# Patient Record
Sex: MALE | Race: WHITE | NOT HISPANIC OR LATINO
[De-identification: names, ages, dates, MRNs, and addresses within clinical notes are randomized per-mention and may not be internally consistent; named-entity substitution may affect disease eponyms.]

---

## 2022-07-12 ENCOUNTER — APPOINTMENT (OUTPATIENT)
Dept: SURGERY | Facility: CLINIC | Age: 62
End: 2022-07-12

## 2022-07-12 VITALS
RESPIRATION RATE: 18 BRPM | SYSTOLIC BLOOD PRESSURE: 131 MMHG | BODY MASS INDEX: 29.55 KG/M2 | DIASTOLIC BLOOD PRESSURE: 80 MMHG | OXYGEN SATURATION: 98 % | HEIGHT: 68 IN | TEMPERATURE: 98 F | HEART RATE: 63 BPM | WEIGHT: 195 LBS

## 2022-07-12 DIAGNOSIS — K62.5 HEMORRHAGE OF ANUS AND RECTUM: ICD-10-CM

## 2022-07-12 DIAGNOSIS — K64.8 OTHER HEMORRHOIDS: ICD-10-CM

## 2022-07-12 DIAGNOSIS — Z82.49 FAMILY HISTORY OF ISCHEMIC HEART DISEASE AND OTHER DISEASES OF THE CIRCULATORY SYSTEM: ICD-10-CM

## 2022-07-12 DIAGNOSIS — Z78.9 OTHER SPECIFIED HEALTH STATUS: ICD-10-CM

## 2022-07-12 PROCEDURE — 99203 OFFICE O/P NEW LOW 30 MIN: CPT | Mod: 25

## 2022-07-12 PROCEDURE — 46600 DIAGNOSTIC ANOSCOPY SPX: CPT

## 2022-07-12 RX ORDER — EZETIMIBE 10 MG/1
10 TABLET ORAL
Qty: 30 | Refills: 0 | Status: ACTIVE | COMMUNITY
Start: 2022-03-29

## 2022-07-12 RX ORDER — NYSTATIN AND TRIAMCINOLONE ACETONIDE 100000; 1 MG/G; MG/G
100000-0.1 CREAM TOPICAL 3 TIMES DAILY
Qty: 30 | Refills: 3 | Status: ACTIVE | COMMUNITY
Start: 2022-07-12 | End: 1900-01-01

## 2022-07-12 RX ORDER — SERTRALINE HYDROCHLORIDE 100 MG/1
100 TABLET, FILM COATED ORAL
Qty: 45 | Refills: 0 | Status: ACTIVE | COMMUNITY
Start: 2022-03-25

## 2022-07-12 NOTE — HISTORY OF PRESENT ILLNESS
[FreeTextEntry1] : Quinton is a 61 year old male here for consultation. \par \par Colonoscopy 7/7/21 by Dr. Ang- benign neoplasm of sigmoid colon, internal hemorrhoids. Pathology; sigmoid polyp biopsy- polypoid colonic mucosa demonstrating focal hyperplastic features. \par \par Today pt reports feeling well, no pain. 1-2 formed BMs daily, no pain, pt reports BRB dripping into toilet for 3-4 months, last episode of rectal bleeding 3 weeks ago. Denies prolapsing tissue. No episodes of incontinence. Good appetite, no nausea, no vomiting. Denies fever and chills. Pt on Aspiring 81 mg daily for 1 cardiac stent. \par

## 2022-07-12 NOTE — PHYSICAL EXAM
[Normal Breath Sounds] : Normal breath sounds [Normal Heart Sounds] : normal heart sounds [No Rash or Lesion] : No rash or lesion [Alert] : alert [Oriented to Person] : oriented to person [Oriented to Place] : oriented to place [Oriented to Time] : oriented to time [Calm] : calm [Abdomen Masses] : No abdominal masses [Abdomen Tenderness] : ~T No ~M abdominal tenderness [No HSM] : no hepatosplenomegaly [JVD] : no jugular venous distention  [de-identified] : Perianal rash consistent with fungal rash.  Digital rectal examination and perianal inspection revealed no evidence of fissure or fistula or abscess.  Anoscopy reveals enlarged nonbleeding internal hemorrhoids with otherwise normal distal rectal mucosa [de-identified] : WNL [de-identified] : WNL [de-identified] : ROM WNL

## 2022-07-12 NOTE — ASSESSMENT
[FreeTextEntry1] : In summary the patient had rectal bleeding several months ago.  His last episode was 3 to 4 weeks ago.  This is almost surely from his internal hemorrhoids.  I recommended fiber supplementation and rubber band ligations of his internal hemorrhoids if his bleeding recurs.  He should hold his baby aspirin for several days prior to the procedure if okay with his cardiologist.  I also prescribed topical nystatin cream for his perianal rash

## 2022-07-12 NOTE — CONSULT LETTER
[Dear  ___] : Dear  [unfilled], [Consult Letter:] : I had the pleasure of evaluating your patient, [unfilled]. [Please see my note below.] : Please see my note below. [Consult Closing:] : Thank you very much for allowing me to participate in the care of this patient.  If you have any questions, please do not hesitate to contact me. [Sincerely,] : Sincerely, [FreeTextEntry3] : Jaime Palma M.D., F.A.C.S, F.A.S.C.R.S [DrHawa  ___] : Dr. CAR [DrHawa ___] : Dr. CAR

## 2022-11-15 ENCOUNTER — APPOINTMENT (OUTPATIENT)
Dept: ORTHOPEDIC SURGERY | Facility: CLINIC | Age: 62
End: 2022-11-15

## 2022-11-15 VITALS — HEIGHT: 68 IN | BODY MASS INDEX: 29.55 KG/M2 | WEIGHT: 195 LBS

## 2022-11-15 PROCEDURE — 20550 NJX 1 TENDON SHEATH/LIGAMENT: CPT

## 2022-11-15 PROCEDURE — 99214 OFFICE O/P EST MOD 30 MIN: CPT | Mod: 25

## 2022-11-15 NOTE — IMAGING
[de-identified] : left middle finger TTP a1 pulley\par +triggering\par otherwise farom\par NVID\par

## 2022-11-15 NOTE — ASSESSMENT
[FreeTextEntry1] : We reviewed the anatomy of the flexor sheath and pathology of trigger fingers with the use of drawings and discussion.  We discussed the treatment options including splinting/nsaids, injection and surgery.  We discussed that too many injections may lead to weakening o the tendon/tendon rupture and the safety of two injections. After a discussion of the risks, benefits and alternatives along with the expectations, the patient was amenable to injection.  The indication for injection is pain and inflammation.  The skin overlying the tendon sheath/A1 pulley site was prepared with alcohol and ethyl chloride was sprayed topically.  Sterile technique was used. An injection of 1ml of lidocaine and 6mg of betamethasone was used.  The patient was instructed to call if redness, pain or fever occur.  They ay apply ice for 15 minutes eery hour for the next 12-24 hours as tolerated.  .  The patient understands that it may take 2-5 days to see a noticeable difference.  Sterile Band-Aid was applied.\par \par Pt was given 1st TF CSI in left middle finger today.

## 2022-11-15 NOTE — HISTORY OF PRESENT ILLNESS
[de-identified] : 11/15/22:  Pt has left middle finger triggering\par \par 3/31/22: Pt is still feeling soreness after left LF trigger finger percutaneous release. Left RF, MF,\par and right thumb are sore too\par \par 2/8/2022: Pt here s/p left pinky trigger finger percutaneous release. Pt has mild pain, however there is no locking noted.\par Pt also complains of recurrent right lateral elbow pain over past week. \par \par 1/31/2022: LHD male here for f/u of left pinky finger triggering. Pt has already received 3 previous CSIs. Pt states that\par his symptoms began again this past October. \par \par 8/31/2021: Pt is here for f/u of right thumb and left pinky trigger fingers. Both of these digits have been injected twice\par (12/10/20 and 5/3/21).\par \par 5/03/21: Pt here s/p right index finger percutaneous release. Pt still complains of recurrent right trigger finger s/p CSI x\par 2 (pt noted pain relief x 2 mos). \par Pt also complains of left pinky triggering today. \par \par 2/02/21: rt if tf better after perc release. now c/o rt thumb pain. \par \par 12/10/20: Rt if tf symptoms reoccurred. Rt elbow symptoms same. \par \par 8/10/2020: Pt states s/p CSI to the right lateral epicondylar region and formal PT with minimal relief of pain. Pt is here\par to discuss PRP injection. \par \par 6/11/20: improved both.\par \par 5/18/20: R IF TF and R latera elbow pain

## 2023-03-14 ENCOUNTER — APPOINTMENT (OUTPATIENT)
Dept: ORTHOPEDIC SURGERY | Facility: CLINIC | Age: 63
End: 2023-03-14
Payer: COMMERCIAL

## 2023-03-14 VITALS — BODY MASS INDEX: 29.55 KG/M2 | WEIGHT: 195 LBS | HEIGHT: 68 IN

## 2023-03-14 PROCEDURE — 20550 NJX 1 TENDON SHEATH/LIGAMENT: CPT | Mod: LT

## 2023-03-14 PROCEDURE — 99214 OFFICE O/P EST MOD 30 MIN: CPT | Mod: 25

## 2023-03-14 NOTE — ASSESSMENT
[FreeTextEntry1] : We reviewed the anatomy of the flexor sheath and pathology of trigger fingers with the use of drawings and discussion.  We discussed the treatment options including splinting/nsaids, injection and surgery.  We discussed that too many injections may lead to weakening o the tendon/tendon rupture and the safety of two injections. After a discussion of the risks, benefits and alternatives along with the expectations, the patient was amenable to injection.  The indication for injection is pain and inflammation.  The skin overlying the tendon sheath/A1 pulley site was prepared with alcohol and ethyl chloride was sprayed topically.  Sterile technique was used. An injection of 1ml of lidocaine and 6mg of betamethasone was used.  The patient was instructed to call if redness, pain or fever occur.  They ay apply ice for 15 minutes eery hour for the next 12-24 hours as tolerated.  .  The patient understands that it may take 2-5 days to see a noticeable difference.  Sterile Band-Aid was applied.\par \par Pt was given 2nd TF CSI in left middle finger today.

## 2023-03-14 NOTE — IMAGING
[de-identified] : left middle finger TTP a1 pulley\par +triggering\par otherwise farom\par NVID\par

## 2023-03-14 NOTE — HISTORY OF PRESENT ILLNESS
[4] : 4 [2] : 2 [Dull/Aching] : dull/aching [Localized] : localized [de-identified] : 3/14/23:  Triggering in left middle finger has recurred. \par \par 11/15/22:  Pt has left middle finger triggering\par \par 3/31/22: Pt is still feeling soreness after left LF trigger finger percutaneous release. Left RF, MF,\par and right thumb are sore too\par \par 2/8/2022: Pt here s/p left pinky trigger finger percutaneous release. Pt has mild pain, however there is no locking noted.\par Pt also complains of recurrent right lateral elbow pain over past week. \par \par 1/31/2022: LHD male here for f/u of left pinky finger triggering. Pt has already received 3 previous CSIs. Pt states that\par his symptoms began again this past October. \par \par 8/31/2021: Pt is here for f/u of right thumb and left pinky trigger fingers. Both of these digits have been injected twice\par (12/10/20 and 5/3/21).\par \par 5/03/21: Pt here s/p right index finger percutaneous release. Pt still complains of recurrent right trigger finger s/p CSI x\par 2 (pt noted pain relief x 2 mos). \par Pt also complains of left pinky triggering today. \par \par 2/02/21: rt if tf better after perc release. now c/o rt thumb pain. \par \par 12/10/20: Rt if tf symptoms reoccurred. Rt elbow symptoms same. \par \par 8/10/2020: Pt states s/p CSI to the right lateral epicondylar region and formal PT with minimal relief of pain. Pt is here\par to discuss PRP injection. \par \par 6/11/20: improved both.\par \par 5/18/20: R IF TF and R latera elbow pain [] : Post Surgical Visit: no [FreeTextEntry1] : left middle finger [FreeTextEntry5] : P

## 2023-06-07 ENCOUNTER — OFFICE (OUTPATIENT)
Dept: URBAN - METROPOLITAN AREA CLINIC 77 | Facility: CLINIC | Age: 63
Setting detail: OPHTHALMOLOGY
End: 2023-06-07
Payer: COMMERCIAL

## 2023-06-07 DIAGNOSIS — H44.23: ICD-10-CM

## 2023-06-07 DIAGNOSIS — H35.3111: ICD-10-CM

## 2023-06-07 DIAGNOSIS — H35.3221: ICD-10-CM

## 2023-06-07 DIAGNOSIS — H35.40: ICD-10-CM

## 2023-06-07 PROCEDURE — 92250 FUNDUS PHOTOGRAPHY W/I&R: CPT | Performed by: OPHTHALMOLOGY

## 2023-06-07 PROCEDURE — 99204 OFFICE O/P NEW MOD 45 MIN: CPT | Performed by: OPHTHALMOLOGY

## 2023-06-07 PROCEDURE — 67028 INJECTION EYE DRUG: CPT | Performed by: OPHTHALMOLOGY

## 2023-06-07 PROCEDURE — 92235 FLUORESCEIN ANGRPH MLTIFRAME: CPT | Performed by: OPHTHALMOLOGY

## 2023-06-07 ASSESSMENT — REFRACTION_AUTOREFRACTION
OS_CYLINDER: -2.50
OD_SPHERE: -8.50
OD_AXIS: 043
OD_CYLINDER: -1.00
OS_SPHERE: -7.25
OS_AXIS: 046

## 2023-06-07 ASSESSMENT — REFRACTION_CURRENTRX
OD_CYLINDER: -1.00
OD_SPHERE: -8.25
OD_VPRISM_DIRECTION: SV
OS_VPRISM_DIRECTION: SV
OD_OVR_VA: 20/
OS_AXIS: 097
OS_SPHERE: -8.25
OS_CYLINDER: -1.00
OS_OVR_VA: 20/
OD_AXIS: 090

## 2023-06-07 ASSESSMENT — KERATOMETRY
OS_K1POWER_DIOPTERS: 44.75
OD_K2POWER_DIOPTERS: 45.00
OD_K1POWER_DIOPTERS: 44.50
OS_K2POWER_DIOPTERS: 46.00
OD_AXISANGLE_DEGREES: 169
OS_AXISANGLE_DEGREES: 031

## 2023-06-07 ASSESSMENT — SPHEQUIV_DERIVED
OS_SPHEQUIV: -8.5
OD_SPHEQUIV: -9

## 2023-06-07 ASSESSMENT — CONFRONTATIONAL VISUAL FIELD TEST (CVF)
OD_FINDINGS: FULL
OS_FINDINGS: FULL

## 2023-06-07 ASSESSMENT — VISUAL ACUITY
OS_BCVA: 20/20-2
OD_BCVA: 20/40

## 2023-06-07 ASSESSMENT — AXIALLENGTH_DERIVED
OS_AL: 26.5429
OD_AL: 27.0912

## 2023-07-06 ENCOUNTER — APPOINTMENT (OUTPATIENT)
Dept: ORTHOPEDIC SURGERY | Facility: CLINIC | Age: 63
End: 2023-07-06

## 2023-08-07 ENCOUNTER — APPOINTMENT (OUTPATIENT)
Dept: ORTHOPEDIC SURGERY | Facility: CLINIC | Age: 63
End: 2023-08-07
Payer: COMMERCIAL

## 2023-08-07 VITALS — HEIGHT: 68 IN | WEIGHT: 195 LBS | BODY MASS INDEX: 29.55 KG/M2

## 2023-08-07 DIAGNOSIS — M65.352 TRIGGER FINGER, LEFT LITTLE FINGER: ICD-10-CM

## 2023-08-07 DIAGNOSIS — M65.322 TRIGGER FINGER, LEFT INDEX FINGER: ICD-10-CM

## 2023-08-07 PROCEDURE — 20550 NJX 1 TENDON SHEATH/LIGAMENT: CPT | Mod: LT

## 2023-08-07 NOTE — HISTORY OF PRESENT ILLNESS
[4] : 4 [2] : 2 [Dull/Aching] : dull/aching [Localized] : localized [de-identified] : 8/7/23:  Pt has triggering in left index, middle, and small finger.  3/14/23:  Triggering in left middle finger has recurred.   11/15/22:  Pt has left middle finger triggering  3/31/22: Pt is still feeling soreness after left LF trigger finger percutaneous release. Left RF, MF, and right thumb are sore too  2/8/2022: Pt here s/p left pinky trigger finger percutaneous release. Pt has mild pain, however there is no locking noted. Pt also complains of recurrent right lateral elbow pain over past week.   1/31/2022: LHD male here for f/u of left pinky finger triggering. Pt has already received 3 previous CSIs. Pt states that his symptoms began again this past October.   8/31/2021: Pt is here for f/u of right thumb and left pinky trigger fingers. Both of these digits have been injected twice (12/10/20 and 5/3/21).  5/03/21: Pt here s/p right index finger percutaneous release. Pt still complains of recurrent right trigger finger s/p CSI x 2 (pt noted pain relief x 2 mos).  Pt also complains of left pinky triggering today.   2/02/21: rt if tf better after perc release. now c/o rt thumb pain.   12/10/20: Rt if tf symptoms reoccurred. Rt elbow symptoms same.   8/10/2020: Pt states s/p CSI to the right lateral epicondylar region and formal PT with minimal relief of pain. Pt is here to discuss PRP injection.   6/11/20: improved both.  5/18/20: R IF TF and R latera elbow pain [] : Post Surgical Visit: no [FreeTextEntry1] : left middle finger

## 2023-08-07 NOTE — ASSESSMENT
[FreeTextEntry1] : We reviewed the anatomy of the flexor sheath and pathology of trigger fingers with the use of drawings and discussion.  We discussed the treatment options including splinting/nsaids, injection and surgery.  We discussed that too many injections may lead to weakening o the tendon/tendon rupture and the safety of two injections. After a discussion of the risks, benefits and alternatives along with the expectations, the patient was amenable to injection.  The indication for injection is pain and inflammation.  The skin overlying the tendon sheath/A1 pulley site was prepared with alcohol and ethyl chloride was sprayed topically.  Sterile technique was used. An injection of 1ml of lidocaine and 6mg of betamethasone was used.  The patient was instructed to call if redness, pain or fever occur.  They ay apply ice for 15 minutes eery hour for the next 12-24 hours as tolerated.  .  The patient understands that it may take 2-5 days to see a noticeable difference.  Sterile Band-Aid was applied.  Pt was given 3rd TF CSI in left middle finger today. Pt was given 1st TF CSI in left index finger today

## 2023-08-07 NOTE — IMAGING
[de-identified] : left middle finger TTP a1 pulley\par  +triggering\par  otherwise farom\par  NVID\par

## 2023-12-07 ENCOUNTER — APPOINTMENT (OUTPATIENT)
Dept: ORTHOPEDIC SURGERY | Facility: CLINIC | Age: 63
End: 2023-12-07
Payer: COMMERCIAL

## 2023-12-07 VITALS — HEIGHT: 68 IN | BODY MASS INDEX: 29.55 KG/M2 | WEIGHT: 195 LBS

## 2023-12-07 PROCEDURE — 99214 OFFICE O/P EST MOD 30 MIN: CPT

## 2023-12-29 ENCOUNTER — APPOINTMENT (OUTPATIENT)
Dept: ORTHOPEDIC SURGERY | Facility: CLINIC | Age: 63
End: 2023-12-29
Payer: COMMERCIAL

## 2023-12-29 VITALS — BODY MASS INDEX: 31.07 KG/M2 | HEIGHT: 68 IN | WEIGHT: 205 LBS

## 2023-12-29 DIAGNOSIS — M17.12 UNILATERAL PRIMARY OSTEOARTHRITIS, LEFT KNEE: ICD-10-CM

## 2023-12-29 DIAGNOSIS — E78.00 PURE HYPERCHOLESTEROLEMIA, UNSPECIFIED: ICD-10-CM

## 2023-12-29 PROCEDURE — 99203 OFFICE O/P NEW LOW 30 MIN: CPT | Mod: 25

## 2023-12-29 PROCEDURE — 73564 X-RAY EXAM KNEE 4 OR MORE: CPT | Mod: LT

## 2023-12-29 PROCEDURE — 99213 OFFICE O/P EST LOW 20 MIN: CPT | Mod: 25

## 2023-12-29 NOTE — HISTORY OF PRESENT ILLNESS
[Gradual] : gradual [6] : 6 [4] : 4 [Dull/Aching] : dull/aching [Throbbing] : throbbing [Occasional] : occasional [Social interactions] : social interactions [Rest] : rest [Walking] : walking [Stairs] : stairs [Full time] : Work status: full time [de-identified] : 63M p/w L knee pain. He complains of medial knee pain intermittently. He denies any injury or trauma. no radiation or numbness.He denies any mechanical. [] : Post Surgical Visit: no [FreeTextEntry5] : no injury , pain for 3-4 weeks  [de-identified] : real

## 2023-12-29 NOTE — ASSESSMENT
[FreeTextEntry1] : 63M p/w L knee OA  Continue conservative care NSAIDs as needed for pain return as needed   The natural progression of Osteoarthritis was explained to the patient. We discussed the possible treatment options from conservative to operative. These included NSAIDS, Glucosamine and Chondrotin sulfate, and Physical Therapy as well different types of injections. We also discussed that at some point they may progress to needed a TKA. Information and pamphlets were given.

## 2023-12-29 NOTE — PHYSICAL EXAM
[NL (140)] : flexion 140 degrees [NL (0)] : extension 0 degrees [5___] : hamstring 5[unfilled]/5 [] : light touch is intact throughout [Left] : left knee [Mild tricompartmental OA medial narrowing] : Mild tricompartmental OA medial narrowing

## 2023-12-29 NOTE — DISCUSSION/SUMMARY
[de-identified] : The patient's current medication management of their orthopedic diagnosis was reviewed today:   (1) We discussed a comprehensive treatment plan that included possible pharmaceutical management involving the use of prescription strength medications including but not limited to options such as oral Naprosyn 500mg BID, once daily Meloxicam 15 mg, or 500-650 mg Tylenol versus over the counter oral medications and topical prescription NSAID Pennsaid vs over the counter Voltaren gel.   (2) There is a moderate risk of morbidity with further treatment, especially from use of prescription strength medications and possible side effects of these medications which include upset stomach with oral medications, skin reactions to topical medications and cardiac/renal issues with long term use.   (3) I recommended that the patient follow-up with their medical physician to discuss any significant specific potential issues with long term medication use such as interactions with current medications or with exacerbation of underlying medical comorbidities.   (4) The benefits and risks associated with use of injectable, oral or topical, prescription and over the counter anti-inflammatory medications were discussed with the patient. The patient voiced understanding of the risks including but not limited to bleeding, stroke, kidney dysfunction, heart disease, and were referred to the black box warning label for further information.  Prior to appointment and during encounter with patient extensive medical records were reviewed including but not limited to, hospital records, out patient records, imaging results, and lab data. During this appointment the patient was examined, diagnoses were discussed and explained in a face to face manner. In addition extensive time was spent reviewing aforementioned diagnostic studies. Counseling including abnormal image results, differential diagnoses, treatment options, risk and benefits, lifestyle changes, current condition, and current medications was performed. Patient's comments, questions, and concerns were address and patient verbalized understanding.

## 2024-02-09 ENCOUNTER — APPOINTMENT (OUTPATIENT)
Age: 64
End: 2024-02-09
Payer: COMMERCIAL

## 2024-02-09 PROCEDURE — 26055 INCISE FINGER TENDON SHEATH: CPT | Mod: F2

## 2024-02-09 PROCEDURE — 26055 INCISE FINGER TENDON SHEATH: CPT | Mod: AS,F2

## 2024-02-09 RX ORDER — IBUPROFEN 800 MG/1
800 TABLET, FILM COATED ORAL 4 TIMES DAILY
Qty: 40 | Refills: 2 | Status: ACTIVE | COMMUNITY
Start: 2024-02-09 | End: 1900-01-01

## 2024-02-09 RX ORDER — ACETAMINOPHEN AND CODEINE PHOSPHATE 300; 30 MG/1; MG/1
300-30 TABLET ORAL EVERY 6 HOURS
Qty: 12 | Refills: 0 | Status: DISCONTINUED | COMMUNITY
Start: 2024-02-08 | End: 2024-02-09

## 2024-02-20 ENCOUNTER — APPOINTMENT (OUTPATIENT)
Dept: ORTHOPEDIC SURGERY | Facility: CLINIC | Age: 64
End: 2024-02-20
Payer: COMMERCIAL

## 2024-02-20 DIAGNOSIS — M65.332 TRIGGER FINGER, LEFT MIDDLE FINGER: ICD-10-CM

## 2024-02-20 PROCEDURE — 99024 POSTOP FOLLOW-UP VISIT: CPT

## 2024-02-20 NOTE — HISTORY OF PRESENT ILLNESS
[Left Arm] : left arm [Intermittent] : intermittent [Rest] : rest [Exercising] : exercising [] : Post Surgical Visit: yes [de-identified] : DOS: 2/9/24- LEFT MIDDLE FINGER TRIGGER FINGER RELEASE  2/20/24:  Pt is doing well s/p TFR  8/7/23:  Pt has triggering in left index, middle, and small finger.  3/14/23:  Triggering in left middle finger has recurred.   11/15/22:  Pt has left middle finger triggering  3/31/22: Pt is still feeling soreness after left LF trigger finger percutaneous release. Left RF, MF, and right thumb are sore too  2/8/2022: Pt here s/p left pinky trigger finger percutaneous release. Pt has mild pain, however there is no locking noted. Pt also complains of recurrent right lateral elbow pain over past week.   1/31/2022: LHD male here for f/u of left pinky finger triggering. Pt has already received 3 previous CSIs. Pt states that his symptoms began again this past October.   8/31/2021: Pt is here for f/u of right thumb and left pinky trigger fingers. Both of these digits have been injected twice (12/10/20 and 5/3/21).  5/03/21: Pt here s/p right index finger percutaneous release. Pt still complains of recurrent right trigger finger s/p CSI x 2 (pt noted pain relief x 2 mos).  Pt also complains of left pinky triggering today.   2/02/21: rt if tf better after perc release. now c/o rt thumb pain.   12/10/20: Rt if tf symptoms reoccurred. Rt elbow symptoms same.   8/10/2020: Pt states s/p CSI to the right lateral epicondylar region and formal PT with minimal relief of pain. Pt is here to discuss PRP injection.   6/11/20: improved both.  5/18/20: R IF TF and R latera elbow pain [de-identified] : 2/9/24 [de-identified] : left middle trigger finger release

## 2024-02-20 NOTE — IMAGING
[de-identified] : left middle finger TTP a1 pulley\par  +triggering\par  otherwise farom\par  NVID\par

## 2024-02-20 NOTE — PHYSICAL EXAM
[de-identified] : wound clean dry and intact no erythema no drainage FAROM NV unchanged sutures removed

## 2024-02-20 NOTE — REASON FOR VISIT
Conveyed message to pt they had full understanding and no further questions.     [FreeTextEntry2] : New Injury Left Knee

## 2024-12-05 ENCOUNTER — APPOINTMENT (OUTPATIENT)
Dept: ORTHOPEDIC SURGERY | Facility: CLINIC | Age: 64
End: 2024-12-05
Payer: COMMERCIAL

## 2024-12-05 VITALS — HEIGHT: 68 IN | BODY MASS INDEX: 31.07 KG/M2 | WEIGHT: 205 LBS

## 2024-12-05 DIAGNOSIS — M65.311 TRIGGER THUMB, RIGHT THUMB: ICD-10-CM

## 2024-12-05 DIAGNOSIS — M65.322 TRIGGER FINGER, LEFT INDEX FINGER: ICD-10-CM

## 2024-12-05 PROCEDURE — 99213 OFFICE O/P EST LOW 20 MIN: CPT | Mod: 25

## 2024-12-05 PROCEDURE — 20550 NJX 1 TENDON SHEATH/LIGAMENT: CPT | Mod: F5
